# Patient Record
Sex: FEMALE
[De-identification: names, ages, dates, MRNs, and addresses within clinical notes are randomized per-mention and may not be internally consistent; named-entity substitution may affect disease eponyms.]

---

## 2017-03-02 NOTE — RAD
LEFT SHOULDER THREE VIEWS:

3/2/17

 

No fracture, dislocation, or AC joint widening was seen. The scapula and visible adjacent ribs appea
r intact. The visible left lung is clear. There is some mild gaseous distention of the stomach with 
mild elevation of the left hemidiaphragm.

 

IMPRESSION:  

No significant bony finding. 

 

POS: HOME

## 2018-02-27 NOTE — RAD
PORTABLE CHEST:

2/27/18

 

Comparison is made with a prior study of 9/10/15.

 

This portable film at 1602 shows a normal sized heart. There is diffuse interstitial prominence in th
e lungs bilaterally with the findings being fairly symmetrical. Infectious etiologies come to mind krishna
ch as viral or mycoplasmal diseases. There is no focal lobar infiltrate or effusion. While the radha a
re arguably a little more prominent than before, given the symmetry of findings, entity such as pulmo
nary embolism are probably less likely. The prominence could just be due to magnification from AP [place]
hnique or even a small amount of adenopathy. Trachea is midline. The bony structures appear normal. 

 

IMPRESSION:  

Mild interstitial prominence. Consider the possibility of viral and mycoplasmal etiologies amongst ot
hers.

 

 

Findings discussed with Dr. Worthington at 1634 on 2/27/18. 

 

Code CR

 

POS: HOME

## 2018-05-13 ENCOUNTER — HOSPITAL ENCOUNTER (EMERGENCY)
Dept: HOSPITAL 57 - BURERS | Age: 38
LOS: 1 days | Discharge: ANOTHER HOSPITAL | End: 2018-05-14
Payer: COMMERCIAL

## 2018-05-13 DIAGNOSIS — B20: ICD-10-CM

## 2018-05-13 DIAGNOSIS — J18.9: Primary | ICD-10-CM

## 2018-05-13 DIAGNOSIS — E11.9: ICD-10-CM

## 2018-05-13 LAB
ACTUAL BICARBONATE (HCO3V): 24 MEQ/L (ref 24–30)
ALBUMIN SERPL BCG-MCNC: 4 G/DL (ref 3.5–5)
ALP SERPL-CCNC: 74 U/L (ref 40–150)
ALT SERPL W P-5'-P-CCNC: 13 U/L (ref 8–55)
ANION GAP SERPL CALC-SCNC: 15 MMOL/L (ref 10–20)
ANISOCYTOSIS BLD QL SMEAR: (no result) (100X)
AST SERPL-CCNC: 24 U/L (ref 5–34)
BACTERIA UR QL AUTO: (no result) HPF
BASE EXCESS BLDA CALC-SCNC: -3.2 MEQ/L
BASOPHILS # BLD AUTO: 0.1 THOU/UL (ref 0–0.2)
BASOPHILS NFR BLD AUTO: 1.7 % (ref 0–1)
BILIRUB SERPL-MCNC: 0.5 MG/DL (ref 0.2–1.2)
BUN SERPL-MCNC: 15 MG/DL (ref 7–18.7)
CALCIUM SERPL-MCNC: 8.9 MG/DL (ref 7.8–10.44)
CHLORIDE SERPL-SCNC: 108 MMOL/L (ref 98–107)
CK MB SERPL-MCNC: 0.5 NG/ML (ref 0–6.6)
CO2 SERPL-SCNC: 23 MMOL/L (ref 22–29)
CREAT CL PREDICTED SERPL C-G-VRATE: 0 ML/MIN (ref 70–130)
EOSINOPHIL # BLD AUTO: 0.3 THOU/UL (ref 0–0.7)
EOSINOPHIL NFR BLD AUTO: 6.7 % (ref 0–10)
GLOBULIN SER CALC-MCNC: 4.3 G/DL (ref 2.4–3.5)
GLUCOSE SERPL-MCNC: 102 MG/DL (ref 70–105)
HEMOGLOBIN (HB): 11.2 G/DL (ref 11.7–15.5)
HGB BLD-MCNC: 11 G/DL (ref 12–16)
LYMPHOCYTES # BLD AUTO: 0.4 THOU/UL (ref 1.2–3.4)
LYMPHOCYTES NFR BLD AUTO: 7 % (ref 21–51)
MCH RBC QN AUTO: 27 PG (ref 27–31)
MCV RBC AUTO: 79.7 FL (ref 81–99)
MDIFF COMPLETE?: YES
MONOCYTES # BLD AUTO: 0.3 THOU/UL (ref 0.11–0.59)
MONOCYTES NFR BLD AUTO: 4.9 % (ref 0–10)
NEUTROPHILS # BLD AUTO: 4.2 THOU/UL (ref 1.4–6.5)
NEUTROPHILS NFR BLD AUTO: 79.7 % (ref 42–75)
PLATELET # BLD AUTO: 116 THOU/UL (ref 130–400)
PLATELET BLD QL SMEAR: (no result)
POTASSIUM SERPL-SCNC: 3.5 MMOL/L (ref 3.5–5.1)
PROT UR STRIP.AUTO-MCNC: (no result) MG/DL
RBC # BLD AUTO: 4.06 MILL/UL (ref 4.2–5.4)
RBC UR QL AUTO: (no result) HPF (ref 0–3)
SODIUM SERPL-SCNC: 142 MMOL/L (ref 136–145)
SP GR UR STRIP: 1.03 (ref 1–1.04)
TROPONIN I SERPL DL<=0.01 NG/ML-MCNC: (no result) NG/ML (ref ?–0.03)
WBC # BLD AUTO: 5.2 THOU/UL (ref 4.8–10.8)
WBC UR QL AUTO: (no result) HPF (ref 0–3)

## 2018-05-13 PROCEDURE — 82553 CREATINE MB FRACTION: CPT

## 2018-05-13 PROCEDURE — 80053 COMPREHEN METABOLIC PANEL: CPT

## 2018-05-13 PROCEDURE — 81015 MICROSCOPIC EXAM OF URINE: CPT

## 2018-05-13 PROCEDURE — 93005 ELECTROCARDIOGRAM TRACING: CPT

## 2018-05-13 PROCEDURE — 87040 BLOOD CULTURE FOR BACTERIA: CPT

## 2018-05-13 PROCEDURE — 71046 X-RAY EXAM CHEST 2 VIEWS: CPT

## 2018-05-13 PROCEDURE — 85652 RBC SED RATE AUTOMATED: CPT

## 2018-05-13 PROCEDURE — 81003 URINALYSIS AUTO W/O SCOPE: CPT

## 2018-05-13 PROCEDURE — 84484 ASSAY OF TROPONIN QUANT: CPT

## 2018-05-13 PROCEDURE — 82805 BLOOD GASES W/O2 SATURATION: CPT

## 2018-05-13 PROCEDURE — 85025 COMPLETE CBC W/AUTO DIFF WBC: CPT

## 2018-05-13 PROCEDURE — 87086 URINE CULTURE/COLONY COUNT: CPT

## 2018-05-14 NOTE — RAD
CHEST 2 VIEWS:

 

DATE: 5/13/18.

 

FINDINGS: 

Comparison is made with a 2/27/18 study.

 

No major lobar consolidation or effusion was seen.  The markings around the left hilum are perhaps a 
little more prominent than the right, but this could be due to positioning and rotation.  Otherwise, 
the exam was unremarkable.  The trachea is midline.

 

IMPRESSION: 

Probably negative study.

 

POS: HOME

## 2019-08-18 ENCOUNTER — HOSPITAL ENCOUNTER (EMERGENCY)
Dept: HOSPITAL 57 - BURERS | Age: 39
Discharge: HOME | End: 2019-08-18
Payer: COMMERCIAL

## 2019-08-18 DIAGNOSIS — E11.9: ICD-10-CM

## 2019-08-18 DIAGNOSIS — S00.211A: ICD-10-CM
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
## 2019-08-18 DIAGNOSIS — S00.81XA: Primary | ICD-10-CM

## 2019-08-18 PROCEDURE — 70450 CT HEAD/BRAIN W/O DYE: CPT
